# Patient Record
Sex: MALE | Race: BLACK OR AFRICAN AMERICAN | NOT HISPANIC OR LATINO | Employment: OTHER | URBAN - METROPOLITAN AREA
[De-identification: names, ages, dates, MRNs, and addresses within clinical notes are randomized per-mention and may not be internally consistent; named-entity substitution may affect disease eponyms.]

---

## 2019-10-28 ENCOUNTER — HISTORICAL (OUTPATIENT)
Dept: ADMINISTRATIVE | Facility: HOSPITAL | Age: 59
End: 2019-10-28

## 2019-11-18 ENCOUNTER — HISTORICAL (OUTPATIENT)
Dept: ADMINISTRATIVE | Facility: HOSPITAL | Age: 59
End: 2019-11-18

## 2022-04-30 NOTE — OP NOTE
Patient:   Luis Manuel Duval             MRN: [id number]            FIN: [id number]               Age:   59 years     Sex:  Unknown     :  1960   Associated Diagnoses:   None   Author:   Malick Eldridge II, MD      Pre-op Dx:  Cataract of the Left eye    Post-op Dx:  Cataract of the Left eye     Procedure:  Cataract extraction by phacoemulsification   with an IOL    Anes:   Topical    Complications: None    Procedure in detail:   Dilating drops were given in the holding area.  The patient was brought into the surgical suite, identified and the correct eye confirmed.  Topical anesthesia was applied.  The eye was then prepped and draped in a sterile fashion.  A supersharp blade was used to make a paracentesis at the 5 oclock position.  1% lidocaine 1.5cc was injected into AC, then viscoelastic was placed in the anterior chamber.  A clear corneal incision was made at the  025degree  position with a keratome blade.  Next, a cystatome and utrata forceps were used to make and remove a 360 degree capsulorrhexis.  The phaco handpiece was placed into the anterior chamber and the lens removed in a divide and conquer fashion.  The remaining cortex was removed with the I & A handpiece.  Viscoelastic was placed into the capsular bag, which remained clear and intact.  An  IOL was placed in the bag and rotated into position.  The remaining viscoelastic was removed with the I &A handpiece.  The incision was hydrated with BSS and checked for leakage.  No leakage was found.  The drapes were removed and antibiotic drops were placed into the eye.  The patient tolerated the procedure well and was moved back to the holding room.  Sunglasses and instructions were personally given to the patient and family.  The patient will follow-up at my office tomorrow.      EFX 58    18.0   ZCBOO IOL        Jose L Eldridge II, M.D.

## 2024-01-11 NOTE — OP NOTE
Patient:   Luis Manuel Duval             MRN: [id number]            FIN: [id number]               Age:   59 years     Sex:  Male     :  1960   Associated Diagnoses:   None   Author:   Malick Eldridge II, MD      Pre-op Dx:  Cataract of the Right eye    Post-op Dx:  Cataract of the Right eye     Procedure:  Cataract extraction by phacoemulsification   with an IOL    Anes:   Topical    Complications: None    Procedure in detail:   Dilating drops were given in the holding area.  The patient was brought into the surgical suite, identified and the correct eye confirmed.  Topical anesthesia was applied.  The eye was then prepped and draped in a sterile fashion.  A supersharp blade was used to make a paracentesis at the 11 oclock position.  1% lidocaine 1.5cc was injected thru the paracentesis into the AC then Viscoelastic was placed in the anterior chamber.  A clear corneal incision was made at the 9 oclock position with a keratome blade.  Next, a cystotome and utrata forceps were used to make a 360 degree capsulorrhexis.  The phaco handpiece was placed into the anterior chamber and the lens removed in a divide and conquer fashion.  The remaining cortex was removed with the I & A handpiece.  Viscoelastic was placed into the capsular bag, which remained clear and intact.  An  IOL was placed in the bag and rotated into position.  The remaining viscoelastic was removed with the I &A handpiece.  The incision was hydrated with BSS and checked for leakage.  No leakage was found.  The drapes were removed and antibiotic drops were placed into the eye.  The patient tolerated the procedure well and was moved back to the holding room.  Sunglasses and instructions were personally given to the patient and family.  The patient will follow-up at my office tomorrow.     EFX 68    19.5    ZCBOO iol        Jose L Eldridge II, M.D.       impaired postural control/decreased strength

## 2024-02-06 PROBLEM — I10 HYPERTENSION: Status: ACTIVE | Noted: 2024-02-06

## 2024-02-06 PROBLEM — H40.9 GLAUCOMA: Status: ACTIVE | Noted: 2024-02-06

## 2024-02-06 PROBLEM — E11.65 TYPE 2 DIABETES MELLITUS WITH HYPERGLYCEMIA, WITHOUT LONG-TERM CURRENT USE OF INSULIN: Status: ACTIVE | Noted: 2024-02-06

## 2024-02-06 PROBLEM — H26.9 CATARACT OF BOTH EYES: Status: RESOLVED | Noted: 2024-02-06 | Resolved: 2024-02-06

## 2024-02-06 PROBLEM — E11.9 DIABETES MELLITUS: Status: ACTIVE | Noted: 2024-02-06

## 2024-02-06 PROBLEM — E78.5 HYPERLIPIDEMIA: Status: ACTIVE | Noted: 2024-02-06

## 2024-02-06 PROBLEM — N40.1 BENIGN PROSTATIC HYPERPLASIA WITH LOWER URINARY TRACT SYMPTOMS: Status: ACTIVE | Noted: 2024-02-06

## 2024-02-06 PROBLEM — S83.419A SPRAIN OF MEDIAL COLLATERAL LIGAMENT OF KNEE: Status: RESOLVED | Noted: 2024-02-06 | Resolved: 2024-02-06

## 2024-02-21 PROBLEM — R79.89 LOW TSH LEVEL: Status: ACTIVE | Noted: 2024-02-21

## 2024-03-08 PROBLEM — Z00.00 WELLNESS EXAMINATION: Status: ACTIVE | Noted: 2024-03-08

## 2024-03-08 PROBLEM — H61.23 BILATERAL IMPACTED CERUMEN: Status: ACTIVE | Noted: 2024-03-08

## 2024-03-17 PROBLEM — E11.3299 NONPROLIFERATIVE DIABETIC RETINOPATHY: Status: ACTIVE | Noted: 2024-03-17

## 2024-04-09 ENCOUNTER — OFFICE VISIT (OUTPATIENT)
Dept: SURGICAL ONCOLOGY | Facility: CLINIC | Age: 64
End: 2024-04-09
Payer: COMMERCIAL

## 2024-04-09 VITALS
SYSTOLIC BLOOD PRESSURE: 135 MMHG | WEIGHT: 145.19 LBS | BODY MASS INDEX: 21.51 KG/M2 | HEIGHT: 69 IN | DIASTOLIC BLOOD PRESSURE: 84 MMHG

## 2024-04-09 DIAGNOSIS — E04.2 MULTINODULAR THYROID: Primary | ICD-10-CM

## 2024-04-09 DIAGNOSIS — E04.2 MULTIPLE THYROID NODULES: ICD-10-CM

## 2024-04-09 PROCEDURE — 3079F DIAST BP 80-89 MM HG: CPT | Mod: CPTII,S$GLB,, | Performed by: OTOLARYNGOLOGY

## 2024-04-09 PROCEDURE — 3046F HEMOGLOBIN A1C LEVEL >9.0%: CPT | Mod: CPTII,S$GLB,, | Performed by: OTOLARYNGOLOGY

## 2024-04-09 PROCEDURE — 3075F SYST BP GE 130 - 139MM HG: CPT | Mod: CPTII,S$GLB,, | Performed by: OTOLARYNGOLOGY

## 2024-04-09 PROCEDURE — 99205 OFFICE O/P NEW HI 60 MIN: CPT | Mod: 25,S$GLB,, | Performed by: OTOLARYNGOLOGY

## 2024-04-09 PROCEDURE — 1159F MED LIST DOCD IN RCRD: CPT | Mod: CPTII,S$GLB,, | Performed by: OTOLARYNGOLOGY

## 2024-04-09 PROCEDURE — 3008F BODY MASS INDEX DOCD: CPT | Mod: CPTII,S$GLB,, | Performed by: OTOLARYNGOLOGY

## 2024-04-09 PROCEDURE — 3061F NEG MICROALBUMINURIA REV: CPT | Mod: CPTII,S$GLB,, | Performed by: OTOLARYNGOLOGY

## 2024-04-09 PROCEDURE — 10006 FNA BX W/US GDN EA ADDL: CPT | Mod: S$GLB,,, | Performed by: OTOLARYNGOLOGY

## 2024-04-09 PROCEDURE — 10005 FNA BX W/US GDN 1ST LES: CPT | Mod: S$GLB,,, | Performed by: OTOLARYNGOLOGY

## 2024-04-09 PROCEDURE — 99999 PR PBB SHADOW E&M-EST. PATIENT-LVL III: CPT | Mod: PBBFAC,,, | Performed by: OTOLARYNGOLOGY

## 2024-04-09 PROCEDURE — 4010F ACE/ARB THERAPY RXD/TAKEN: CPT | Mod: CPTII,S$GLB,, | Performed by: OTOLARYNGOLOGY

## 2024-04-09 PROCEDURE — 3066F NEPHROPATHY DOC TX: CPT | Mod: CPTII,S$GLB,, | Performed by: OTOLARYNGOLOGY

## 2024-04-09 NOTE — PROGRESS NOTES
[facility]  Head and Neck Surgical Oncology Clinic     Patient ID: [id number]     Chief Complaint:  Referral for Multiple Thyroid Nodules      HPI:     Luis Manuel Duval Jr. is a 63 y.o. male here today for evaluation of multiple thyroid nodules.  He has 3 thyroid nodules, 1 on the left, and 2 on the right, all of which meet criteria on the ultrasound for fine-needle aspiration.  He was referred to me in this regard.      He has no family history of thyroid malignancy.  No history of external beam radiation.  No other concern or head or neck complaint.    Past Medical History:   Diagnosis Date    Diabetes mellitus, type 2     Glaucoma     Hyperlipidemia     Hypertension     Sprain of medial collateral ligament of knee 02/06/2024        Past Surgical History:   Procedure Laterality Date    CATARACT EXTRACTION Left     CATARACT EXTRACTION Right         Social History     Tobacco Use    Smoking status: Never    Smokeless tobacco: Never   Substance and Sexual Activity    Alcohol use: Yes     Comment: 3 times a week    Drug use: Not Currently     Types: Marijuana     Comment: mid 20's    Sexual activity: Never        Current Outpatient Medications   Medication Instructions    amLODIPine (NORVASC) 10 mg, Oral, Daily    brimonidine-timoloL (COMBIGAN) 0.2-0.5 % Drop 1 drop, Both Eyes, 2 times daily    glipiZIDE (GLUCOTROL) 10 mg, Oral, 2 times daily, With meals    latanoprost 0.005 % ophthalmic solution 1 drop, Both Eyes, Nightly    metFORMIN (GLUCOPHAGE) 1,000 mg, Oral, 2 times daily with meals    MOUNJARO 5 mg, Subcutaneous, Every 7 days    pravastatin (PRAVACHOL) 20 mg, Oral, Daily    valsartan (DIOVAN) 160 mg, Oral, Daily       Review of patient's allergies indicates:  No Known Allergies     Patient Care Team:  Alberto Mcguire MD as PCP - General (Family Medicine)  Zina Rodarte MD as Consulting Physician (Ophthalmology)  Hermelindo Ham Jr., MD as Consulting Physician (Ophthalmology)  Jez  "Ehsan NI MD as Consulting Physician (Urology)  [facility] (Gastroenterology)     Subjective:     Review of Systems    12 point review of systems conducted, negative except as stated in the history of present illness. See HPI for details.    Objective:     Visit Vitals  /84   Pulse (P) 83   Ht 5' 9" (1.753 m)   Wt 65.9 kg (145 lb 3.2 oz)   BMI 21.44 kg/m²       ENT Physical Exam     General: AOx3, NAD  Cardiac: Well perfused  Respiratory: Breathing without stridor or distress  Right Ear: External Auditory Canal WNL,TM w/o masses/lesions/perforations  Left Ear:  External Auditory Canal WNL,TM w/o masses/lesions/perforations  Nose: No gross nasal septal deviation.  Inferior Turbinates WNL bilaterally.  No septal perforation.  No masses/lesions.  Oral Cavity: FOM Soft, no masses palpated.  Oral Tongue mobile.  Hard Palate WNL.  Oropharynx: BOT WNL.  No masses/lesions noted.  Tonsillar fossa without lesions.  Soft palate without masses.  Midline uvula.  Neck: No palpable lymphadenopathy at I - VI.  Palpable thyroid nodule  Face: House Brackmann I bilaterally.  Eyes: Normal extra ocular motion bilaterally.    Fine needle Aspiration:  Using Afirma, he underwent biopsy of the left thyroid nodule, in addition to the 2 right-sided thyroid nodules.  He tolerated them well.  No issues in terms of bleeding or hematoma.    Diagnostic Imaging:     All pertinent head and neck imaging independently reviewed. Discussed these findings in detail with the patient.    Assessment:       ICD-10-CM ICD-9-CM   1. Multinodular thyroid  E04.2 241.1   2. Multiple thyroid nodules  E04.2 241.1        Plan:     1. Multinodular thyroid  Assessment & Plan:  He underwent fine-needle aspiration of the left sided thyroid nodule, in addition to the 2 right-sided thyroid nodules today, all of which met criteria for fine-needle aspiration.  We will see him back in 2 weeks to go over the results and make a " plan.      2. Multiple thyroid nodules  -     Ambulatory referral/consult to ENT         No follow-ups on file. In addition to their scheduled follow up, the patient has also been instructed to follow up on as needed basis.     Future Appointments   Date Time Provider Department Center   4/23/2024  9:45 AM Rajeev León Jr., MD [facility] [address] [facility]   5/3/2024  8:00 AM Alberto Mcguire MD [facility]        Rajeev León Jr, MD

## 2024-04-09 NOTE — ASSESSMENT & PLAN NOTE
He underwent fine-needle aspiration of the left sided thyroid nodule, in addition to the 2 right-sided thyroid nodules today, all of which met criteria for fine-needle aspiration.  We will see him back in 2 weeks to go over the results and make a plan.

## 2024-04-23 ENCOUNTER — OFFICE VISIT (OUTPATIENT)
Dept: SURGICAL ONCOLOGY | Facility: CLINIC | Age: 64
End: 2024-04-23
Payer: COMMERCIAL

## 2024-04-23 VITALS
BODY MASS INDEX: 21.6 KG/M2 | DIASTOLIC BLOOD PRESSURE: 88 MMHG | SYSTOLIC BLOOD PRESSURE: 167 MMHG | HEIGHT: 69 IN | WEIGHT: 145.81 LBS | HEART RATE: 78 BPM

## 2024-04-23 DIAGNOSIS — E04.2 MULTINODULAR THYROID: Primary | ICD-10-CM

## 2024-04-23 PROCEDURE — 99999 PR PBB SHADOW E&M-EST. PATIENT-LVL III: CPT | Mod: PBBFAC,,,

## 2024-04-23 PROCEDURE — 3008F BODY MASS INDEX DOCD: CPT | Mod: CPTII,S$GLB,,

## 2024-04-23 PROCEDURE — 3077F SYST BP >= 140 MM HG: CPT | Mod: CPTII,S$GLB,,

## 2024-04-23 PROCEDURE — 3046F HEMOGLOBIN A1C LEVEL >9.0%: CPT | Mod: CPTII,S$GLB,,

## 2024-04-23 PROCEDURE — 3066F NEPHROPATHY DOC TX: CPT | Mod: CPTII,S$GLB,,

## 2024-04-23 PROCEDURE — 4010F ACE/ARB THERAPY RXD/TAKEN: CPT | Mod: CPTII,S$GLB,,

## 2024-04-23 PROCEDURE — 99213 OFFICE O/P EST LOW 20 MIN: CPT | Mod: S$GLB,,,

## 2024-04-23 PROCEDURE — 3079F DIAST BP 80-89 MM HG: CPT | Mod: CPTII,S$GLB,,

## 2024-04-23 PROCEDURE — 1159F MED LIST DOCD IN RCRD: CPT | Mod: CPTII,S$GLB,,

## 2024-04-23 PROCEDURE — 3061F NEG MICROALBUMINURIA REV: CPT | Mod: CPTII,S$GLB,,

## 2024-04-23 NOTE — PROGRESS NOTES
[facility]  Head and Neck Surgical Oncology Clinic     Patient ID: [id number]     Chief Complaint: Follow-up (2 wk f/u Multiple Thyroid Nodules/)      HPI:     Luis Manuel Duval Jr. is a 63 y.o. male here today for follow-up.  He had 3 thyroid nodules 1 on the left and 2 on the right which met criteria for fine-needle aspiration.  This was done on 04/09/2024.  He is here to review results and discuss the plan going forward.     Past Medical History:   Diagnosis Date    Diabetes mellitus, type 2     Glaucoma     Hyperlipidemia     Hypertension     Sprain of medial collateral ligament of knee 02/06/2024        Past Surgical History:   Procedure Laterality Date    CATARACT EXTRACTION Left     CATARACT EXTRACTION Right         Social History     Tobacco Use    Smoking status: Never    Smokeless tobacco: Never   Substance and Sexual Activity    Alcohol use: Yes     Comment: 3 times a week    Drug use: Not Currently     Types: Marijuana     Comment: mid 20's    Sexual activity: Never        Current Outpatient Medications   Medication Instructions    amLODIPine (NORVASC) 10 mg, Oral, Daily    brimonidine-timoloL (COMBIGAN) 0.2-0.5 % Drop 1 drop, Both Eyes, 2 times daily    glipiZIDE (GLUCOTROL) 10 mg, Oral, 2 times daily, With meals    latanoprost 0.005 % ophthalmic solution 1 drop, Both Eyes, Nightly    metFORMIN (GLUCOPHAGE) 1,000 mg, Oral, 2 times daily with meals    methocarbamoL (ROBAXIN) 750 MG Tab Take 1-2 tabs every 6 hours as needed for muscle spasms.    MOUNJARO 5 mg, Subcutaneous, Every 7 days    pravastatin (PRAVACHOL) 20 mg, Oral, Daily    valsartan (DIOVAN) 160 mg, Oral, Daily       Review of patient's allergies indicates:  No Known Allergies     Patient Care Team:  Alberto Mcguire MD as PCP - General (Family Medicine)  Zina Rodarte MD as Consulting Physician (Ophthalmology)  Hermelindo Ham Jr., MD as Consulting Physician (Ophthalmology)  Ehsan Story MD as Consulting Physician  "(Urology)  [facility] (Gastroenterology)     Subjective:     Review of Systems    12 point review of systems conducted, negative except as stated in the history of present illness. See HPI for details.    Objective:     Visit Vitals  BP (!) 167/88   Pulse 78   Ht 5' 9" (1.753 m)   Wt 66.1 kg (145 lb 12.8 oz)   BMI 21.53 kg/m²       ENT Physical Exam     General: AOx3, NAD  Cardiac: Well perfused  Respiratory: Breathing without stridor or distress  Right Ear: External Auditory Canal WNL,TM w/o masses/lesions/perforations  Left Ear:  External Auditory Canal WNL,TM w/o masses/lesions/perforations  Nose: No gross nasal septal deviation.  Inferior Turbinates WNL bilaterally.  No septal perforation.  No masses/lesions.  Oral Cavity: FOM Soft, no masses palpated.  Oral Tongue mobile.  Hard Palate WNL.  Oropharynx: BOT WNL.  No masses/lesions noted.  Tonsillar fossa without lesions.  Soft palate without masses.  Midline uvula.  Neck: No palpable lymphadenopathy at I - VI.  Palpable thyroid nodule  Face: House Brackmann I bilaterally.  Eyes: Normal extra ocular motion bilaterally.        Diagnostic Imaging/Pathology:       All pertinent head and neck imaging independently reviewed. Discussed these findings in detail with the patient.    Assessment:       ICD-10-CM ICD-9-CM   1. Multinodular thyroid  E04.2 241.1        Plan:     1. Multinodular thyroid  Assessment & Plan:  -All 3 nodules came back benign on Afirma gene sequencing. He has continued to have normal thyroid labs and is asymptomatic. We will see him back in 1 year with an ultrasound for routine monitoring of the nodules.            No follow-ups on file. In addition to their scheduled follow up, the patient has also been instructed to follow up on as needed basis.     Future Appointments   Date Time Provider Department Center   5/3/2024  8:00 AM Alberto Mcguire MD [facility]   4/22/2025  9:30 AM Abigail Henslye PA " [facility] [address] [facility] [address]        BOYD Castrejon

## 2024-04-23 NOTE — ASSESSMENT & PLAN NOTE
-All 3 nodules came back benign on Afirma gene sequencing. He has continued to have normal thyroid labs and is asymptomatic. We will see him back in 1 year with an ultrasound for routine monitoring of the nodules.

## 2024-06-10 PROBLEM — Z00.00 WELLNESS EXAMINATION: Status: RESOLVED | Noted: 2024-03-08 | Resolved: 2024-06-10

## 2024-10-08 ENCOUNTER — TELEPHONE (OUTPATIENT)
Dept: PHARMACY | Facility: CLINIC | Age: 64
End: 2024-10-08
Payer: COMMERCIAL

## 2024-10-08 NOTE — TELEPHONE ENCOUNTER
[facility] Chart Review & Patient Outreach Details For Medication Adherence Project    Reason for Outreach Encounter: 3rd Party payor non-compliance report ([employer], [employer], C, etc)  2.  Patient Outreach Method: Reviewed patient chart   3.   Medication in question:   Hypertension Medications               amLODIPine (NORVASC) 10 MG tablet Take 1 tablet (10 mg total) by mouth once daily.    valsartan (DIOVAN) 160 MG tablet Take 1 tablet (160 mg total) by mouth once daily.                 BP  last filled  8/26/24 for 90 day supply      4.  Reviewed and or Updates Made To: Patient Chart  5. Outreach Outcomes and/or actions taken: Patient filled medication and is on track to be adherent  Additional Notes:

## 2024-10-12 ENCOUNTER — TELEPHONE (OUTPATIENT)
Dept: PHARMACY | Facility: CLINIC | Age: 64
End: 2024-10-12
Payer: COMMERCIAL

## 2024-10-12 NOTE — TELEPHONE ENCOUNTER
[facility] Chart Review & Patient Outreach Details For Medication Adherence Project    Reason for Outreach Encounter: 3rd Party payor non-compliance report ([employer], [employer], [facility], etc)  2.  Patient Outreach Method: Reviewed Patient Chart  3.   Medication in question: pravastatin   LAST FILLED: 8/22/24 for 90 day supply  Hyperlipidemia Medications               pravastatin (PRAVACHOL) 20 MG tablet Take 1 tablet (20 mg total) by mouth once daily.               4.  Reviewed and or Updates Made To: Patient Chart  5. Outreach Outcomes and/or actions taken: Patient filled medication and is on track to be adherent

## 2024-12-10 ENCOUNTER — TELEPHONE (OUTPATIENT)
Dept: SURGICAL ONCOLOGY | Facility: CLINIC | Age: 64
End: 2024-12-10
Payer: COMMERCIAL

## 2024-12-23 ENCOUNTER — TELEPHONE (OUTPATIENT)
Dept: SURGICAL ONCOLOGY | Facility: CLINIC | Age: 64
End: 2024-12-23
Payer: COMMERCIAL

## 2025-01-10 ENCOUNTER — TELEPHONE (OUTPATIENT)
Dept: PHARMACY | Facility: CLINIC | Age: 65
End: 2025-01-10
Payer: COMMERCIAL

## 2025-01-10 NOTE — TELEPHONE ENCOUNTER
[facility] Chart Review & Patient Outreach Details For Medication Adherence Project    Reason for Outreach Encounter: 3rd Party payor non-compliance report ([employer], [employer], C, etc)  2.  Patient Outreach Method: Reviewed patient chart   3.   Medication in question:    Hyperlipidemia Medications               pravastatin (PRAVACHOL) 20 MG tablet Take 1 tablet (20 mg total) by mouth once daily.                  Pravastatin  last filled  11/30/24 for 90 day supply      4.  Reviewed and or Updates Made To: Patient Chart  5. Outreach Outcomes and/or actions taken: Patient filled medication and is on track to be adherent  Additional Notes:       
Unable to offer due to clinical condition

## 2025-02-24 PROBLEM — E11.3313 TYPE 2 DIABETES MELLITUS WITH BOTH EYES AFFECTED BY MODERATE NONPROLIFERATIVE RETINOPATHY AND MACULAR EDEMA, WITHOUT LONG-TERM CURRENT USE OF INSULIN: Status: ACTIVE | Noted: 2025-02-24

## 2025-02-24 PROBLEM — Z00.00 WELLNESS EXAMINATION: Chronic | Status: ACTIVE | Noted: 2024-03-08

## 2025-05-29 PROBLEM — H61.22 LEFT EAR IMPACTED CERUMEN: Status: ACTIVE | Noted: 2024-03-08

## 2025-05-29 PROBLEM — E78.49 OTHER HYPERLIPIDEMIA: Status: ACTIVE | Noted: 2024-02-06

## 2025-06-22 ENCOUNTER — TELEPHONE (OUTPATIENT)
Dept: PHARMACY | Facility: CLINIC | Age: 65
End: 2025-06-22
Payer: COMMERCIAL

## 2025-06-22 NOTE — TELEPHONE ENCOUNTER
[facility] Chart Review & Patient Outreach Details For Medication Adherence Project    Reason for Outreach Encounter: 3rd Party payor non-compliance report ([employer], [employer], [facility], etc)  2.  Patient Outreach Method: Reviewed Patient Chart  3.   Medication in question: pravastatin   LAST FILLED: 4/15/25 for 90 day supply  Hyperlipidemia Medications              pravastatin (PRAVACHOL) 20 MG tablet Take 1 tablet (20 mg total) by mouth once daily.               4.  Reviewed and or Updates Made To: Patient Chart  5. Outreach Outcomes and/or actions taken: Patient filled medication and is on track to be adherent